# Patient Record
Sex: FEMALE | Race: WHITE | URBAN - METROPOLITAN AREA
[De-identification: names, ages, dates, MRNs, and addresses within clinical notes are randomized per-mention and may not be internally consistent; named-entity substitution may affect disease eponyms.]

---

## 2020-05-14 ENCOUNTER — OFFICE VISIT - RIVER FALLS (OUTPATIENT)
Dept: FAMILY MEDICINE | Facility: CLINIC | Age: 50
End: 2020-05-14

## 2020-05-14 ASSESSMENT — MIFFLIN-ST. JEOR: SCORE: 1452.76

## 2022-02-11 VITALS
RESPIRATION RATE: 16 BRPM | WEIGHT: 195 LBS | BODY MASS INDEX: 35.88 KG/M2 | DIASTOLIC BLOOD PRESSURE: 82 MMHG | TEMPERATURE: 98.9 F | SYSTOLIC BLOOD PRESSURE: 124 MMHG | HEIGHT: 62 IN | HEART RATE: 64 BPM

## 2022-02-16 NOTE — NURSING NOTE
Comprehensive Intake Entered On:  5/14/2020 3:03 PM CDT    Performed On:  5/14/2020 2:53 PM CDT by Byron Patterson CMA               Summary   Chief Complaint :   Newe pt here for Preemployment Px for the [facility].   Weight Measured :   195 lb(Converted to: 195 lb 0 oz, 88.45 kg)    Height Measured :   62 in(Converted to: 5 ft 2 in, 157.48 cm)    Body Mass Index :   35.66 kg/m2 (HI)    Body Surface Area :   1.97 m2   Systolic Blood Pressure :   124 mmHg   Diastolic Blood Pressure :   82 mmHg   Mean Arterial Pressure :   96 mmHg   Peripheral Pulse Rate :   64 bpm   BP Site :   Right arm   Pulse Site :   Radial artery   Temperature Tympanic :   98.9 DegF(Converted to: 37.2 DegC)    Respiratory Rate :   16 br/min   Byron Patterson CMA - 5/14/2020 2:53 PM CDT   Meds / Allergies   (As Of: 5/14/2020 3:03:53 PM CDT)   Allergies (Active)   erythromycin  Estimated Onset Date:   Unspecified ; Reactions:   Nausea ; Created By:   Byron Patterson CMA; Reaction Status:   Active ; Category:   Drug ; Substance:   erythromycin ; Type:   Allergy ; Updated By:   Byron Patterson CMA; Reviewed Date:   5/14/2020 2:57 PM CDT        Medication List   (As Of: 5/14/2020 3:03:53 PM CDT)   Home Meds    atorvastatin  :   atorvastatin ; Status:   Documented ; Ordered As Mnemonic:   atorvastatin 40 mg oral tablet ; Simple Display Line:   40 mg, 1 tab(s), Oral, daily, 30 tab(s), 0 Refill(s) ; Catalog Code:   atorvastatin ; Order Dt/Tm:   5/14/2020 2:57:46 PM CDT          aspirin  :   aspirin ; Status:   Documented ; Ordered As Mnemonic:   aspirin 81 mg oral tablet, chewable ; Simple Display Line:   81 mg, 1 tab(s), Oral, daily, 30 tab(s), 0 Refill(s) ; Catalog Code:   aspirin ; Order Dt/Tm:   5/14/2020 2:57:32 PM CDT          lisinopril  :   lisinopril ; Status:   Documented ; Ordered As Mnemonic:   lisinopril 10 mg oral tablet ; Simple Display Line:   10 mg, 1 tab(s), Oral, daily, 30 tab(s), 0 Refill(s) ; Catalog Code:    lisinopril ; Order Dt/Tm:   5/14/2020 2:57:56 PM CDT            ID Risk Screen   Recent Travel History :   No recent travel   Family Member Travel History :   No recent travel   Other Exposure to Infectious Disease :   Unknown   Byron Patterson CMA - 5/14/2020 2:53 PM CDT   Procedures / Surgeries        -    Procedure History   (As Of: 5/14/2020 3:03:53 PM CDT)     Anesthesia Minutes:   0 ; Procedure Name:   H/O: tubal ligation ; Procedure Minutes:   0 ; Last Reviewed Dt/Tm:   5/14/2020 2:59:12 PM CDT            Anesthesia Minutes:   0 ; Procedure Name:   Extraction of wisdom tooth ; Procedure Minutes:   0 ; Last Reviewed Dt/Tm:   5/14/2020 2:59:05 PM CDT            Family History   Family History   (As Of: 5/14/2020 3:03:53 PM CDT)   Mother:   Relation:   Mother ; Gender:   Female ;           Nomenclature:   HTN (Hypertension) ; Value:   Positive          Father:   Relation:   Father ; Gender:   Male ;           Nomenclature:   HTN (Hypertension) ; Value:   Positive          Grandmother (P):   Relation:   Grandmother (P) ;           Nomenclature:   Diabetes ; Value:   Positive            Social History   Social History   (As Of: 5/14/2020 3:03:53 PM CDT)   Tobacco:        Never (less than 100 in lifetime)   (Last Updated: 5/14/2020 3:00:11 PM CDT by Byron Patterson CMA)

## 2022-02-16 NOTE — NURSING NOTE
CAGE Assessment Entered On:  5/14/2020 3:30 PM CDT    Performed On:  5/14/2020 3:29 PM CDT by Byron Patterson CMA               Assessment   Have you ever felt you should cut down on your drinking :   No   Have people annoyed you by criticizing your drinking :   No   Have you ever felt bad or guilty about your drinking :   No   Have you ever taken a drink first thing in the morning to steady your nerves or get rid of a hangover (Eye-opener) :   No   CAGE Score :   0    Byron Patterson CMA - 5/14/2020 3:29 PM CDT

## 2022-02-16 NOTE — PROGRESS NOTES
Patient:   FLORIDA ARROYO            MRN: [id number]            FIN: [id number]               Age:   50 years     Sex:  Female     :  1970   Associated Diagnoses:   Pre-employment examination   Author:   Prashant Chapa PA-C      Subjective   Here for pre-employment exam for   no concerns today  no risk factors for TB      Health Status   Allergies:    Allergic Reactions (Selected)  Severity Not Documented  Erythromycin (Nausea)   Medications:  (Selected)   Documented Medications  Documented  aspirin 81 mg oral tablet, chewable: = 1 tab(s) ( 81 mg ), Oral, daily, # 30 tab(s), 0 Refill(s), Type: Maintenance  atorvastatin 40 mg oral tablet: = 1 tab(s) ( 40 mg ), Oral, daily, # 30 tab(s), 0 Refill(s), Type: Maintenance  lisinopril 10 mg oral tablet: = 1 tab(s) ( 10 mg ), Oral, daily, # 30 tab(s), 0 Refill(s), Type: Maintenance   Problem list:    All Problems  Obesity / SNOMED CT 4201292912 / Probable  Resolved: H/O: chickenpox / SNOMED CT 091214372      Chief Complaint   2020 2:53 PM CDT    Addi pt here for Preemployment Px for the [facility].      Histories   Past Medical History:    Resolved  H/O: chickenpox ([id number]):  Resolved.   Family History:    Diabetes  Grandmother (P)  HTN (Hypertension)  Mother  Father     Procedure history:    Extraction of wisdom tooth ([id number]).  H/O: tubal ligation ([id number]).   Social History:        Tobacco Assessment            Never (less than 100 in lifetime)        Objective       Vital Signs   2020 2:53 PM CDT Temperature Tympanic 98.9 DegF    Peripheral Pulse Rate 64 bpm    Pulse Site Radial artery    Respiratory Rate 16 br/min    Systolic Blood Pressure 124 mmHg    Diastolic Blood Pressure 82 mmHg    Mean Arterial Pressure 96 mmHg    BP Site Right arm      Measurements from flowsheet : Measurements   2020 2:53 PM CDT Height Measured - Standard 62 in    Weight Measured - Standard 195 lb    BSA 1.97 m2    Body Mass Index 35.66 kg/m2   HI      General:  No acute distress.    Eye:  Pupils are equal, round and reactive to light, Extraocular movements are intact.    HENT:  Tympanic membranes are clear, No pharyngeal erythema, No sinus tenderness.    Neck:  Supple, Non-tender, No lymphadenopathy.    Respiratory:  Lungs are clear to auscultation.    Cardiovascular:  Normal rate, Regular rhythm, No murmur.    Gastrointestinal:  Soft, Non-tender, Non-distended, Normal bowel sounds, No organomegaly.    Musculoskeletal:  Normal range of motion.    Neurologic:  Cranial Nerves II-XII are grossly intact, Normal deep tendon reflexes.    Psychiatric:  Appropriate mood & affect.       Results Review   General results      Plan   Impression and Plan:     Diagnosis     Pre-employment examination ([id number] Z02.1).     .    Condition Form completed and signed.  Approved without restrictions   , and no risk factors for TB.    Orders     Orders   Charges (Evaluation and Management):  [id number] initial preventive medicine new patient 40-64yrs (Charge) (Order): Quantity: 1, Pre-employment examination.     .

## 2022-02-16 NOTE — NURSING NOTE
Depression Screening Entered On:  5/14/2020 3:30 PM CDT    Performed On:  5/14/2020 3:29 PM CDT by Byron Patterson CMA               Depression Screening   Little Interest - Pleasure in Activities :   Not at all   Feeling Down, Depressed, Hopeless :   Not at all   Initial Depression Screen Score :   0    Poor Appetite or Overeating :   Not at all   Trouble Falling or Staying Asleep :   Not at all   Feeling Tired or Little Energy :   Not at all   Feeling Bad About Yourself :   Not at all   Trouble Concentrating :   Not at all   Moving or Speaking Slowly :   Not at all   Thoughts Better Off Dead or Hurting Self :   Not at all   CRISS Difficulty with Work, Home, Others :   Not difficult at all   Detailed Depression Screen Score :   0    Total Depression Screen Score :   0    Byron Patterson CMA - 5/14/2020 3:29 PM CDT

## 2022-02-16 NOTE — NURSING NOTE
Pt Preemployment px faxed to [facility] [phone] Attn:  Opal Sparrow.  COnfirmation received.  Byron Patterson CMA